# Patient Record
Sex: MALE | Race: WHITE | Employment: FULL TIME | URBAN - METROPOLITAN AREA
[De-identification: names, ages, dates, MRNs, and addresses within clinical notes are randomized per-mention and may not be internally consistent; named-entity substitution may affect disease eponyms.]

---

## 2019-08-16 ENCOUNTER — OFFICE VISIT (OUTPATIENT)
Dept: FAMILY MEDICINE CLINIC | Age: 29
End: 2019-08-16
Payer: COMMERCIAL

## 2019-08-16 VITALS
OXYGEN SATURATION: 99 % | WEIGHT: 209 LBS | SYSTOLIC BLOOD PRESSURE: 138 MMHG | BODY MASS INDEX: 25.99 KG/M2 | HEART RATE: 109 BPM | HEIGHT: 75 IN | DIASTOLIC BLOOD PRESSURE: 82 MMHG

## 2019-08-16 DIAGNOSIS — Z87.39 H/O JUVENILE RHEUMATOID ARTHRITIS: ICD-10-CM

## 2019-08-16 DIAGNOSIS — Z76.89 ENCOUNTER TO ESTABLISH CARE WITH NEW DOCTOR: Primary | ICD-10-CM

## 2019-08-16 LAB
BASOPHILS ABSOLUTE: 0 K/UL (ref 0–0.2)
BASOPHILS RELATIVE PERCENT: 1.1 %
C-REACTIVE PROTEIN: <0.3 MG/L (ref 0–5.1)
EOSINOPHILS ABSOLUTE: 0.1 K/UL (ref 0–0.6)
EOSINOPHILS RELATIVE PERCENT: 4.5 %
HCT VFR BLD CALC: 43.2 % (ref 40.5–52.5)
HEMOGLOBIN: 15.3 G/DL (ref 13.5–17.5)
LYMPHOCYTES ABSOLUTE: 1.2 K/UL (ref 1–5.1)
LYMPHOCYTES RELATIVE PERCENT: 37.9 %
MCH RBC QN AUTO: 31.2 PG (ref 26–34)
MCHC RBC AUTO-ENTMCNC: 35.4 G/DL (ref 31–36)
MCV RBC AUTO: 88.3 FL (ref 80–100)
MONOCYTES ABSOLUTE: 0.3 K/UL (ref 0–1.3)
MONOCYTES RELATIVE PERCENT: 8.2 %
NEUTROPHILS ABSOLUTE: 1.6 K/UL (ref 1.7–7.7)
NEUTROPHILS RELATIVE PERCENT: 48.3 %
PDW BLD-RTO: 12.5 % (ref 12.4–15.4)
PLATELET # BLD: 240 K/UL (ref 135–450)
PMV BLD AUTO: 8.2 FL (ref 5–10.5)
RBC # BLD: 4.89 M/UL (ref 4.2–5.9)
RHEUMATOID FACTOR: <10 IU/ML
SEDIMENTATION RATE, ERYTHROCYTE: 4 MM/HR (ref 0–15)
WBC # BLD: 3.3 K/UL (ref 4–11)

## 2019-08-16 PROCEDURE — 99204 OFFICE O/P NEW MOD 45 MIN: CPT | Performed by: FAMILY MEDICINE

## 2019-08-16 RX ORDER — PREDNISONE 1 MG/1
TABLET ORAL
Qty: 30 TABLET | Refills: 0 | Status: SHIPPED | OUTPATIENT
Start: 2019-08-16 | End: 2019-09-05

## 2019-08-16 SDOH — HEALTH STABILITY: MENTAL HEALTH: HOW MANY STANDARD DRINKS CONTAINING ALCOHOL DO YOU HAVE ON A TYPICAL DAY?: 1 OR 2

## 2019-08-16 SDOH — SOCIAL STABILITY: SOCIAL INSECURITY
WITHIN THE LAST YEAR, HAVE YOU BEEN KICKED, HIT, SLAPPED, OR OTHERWISE PHYSICALLY HURT BY YOUR PARTNER OR EX-PARTNER?: NO

## 2019-08-16 SDOH — HEALTH STABILITY: MENTAL HEALTH
STRESS IS WHEN SOMEONE FEELS TENSE, NERVOUS, ANXIOUS, OR CAN'T SLEEP AT NIGHT BECAUSE THEIR MIND IS TROUBLED. HOW STRESSED ARE YOU?: NOT AT ALL

## 2019-08-16 SDOH — ECONOMIC STABILITY: TRANSPORTATION INSECURITY
IN THE PAST 12 MONTHS, HAS THE LACK OF TRANSPORTATION KEPT YOU FROM MEDICAL APPOINTMENTS OR FROM GETTING MEDICATIONS?: NO

## 2019-08-16 SDOH — HEALTH STABILITY: MENTAL HEALTH: HOW OFTEN DO YOU HAVE A DRINK CONTAINING ALCOHOL?: 2-3 TIMES A WEEK

## 2019-08-16 SDOH — SOCIAL STABILITY: SOCIAL NETWORK: HOW OFTEN DO YOU ATTEND CHURCH OR RELIGIOUS SERVICES?: 1 TO 4 TIMES PER YEAR

## 2019-08-16 SDOH — ECONOMIC STABILITY: FOOD INSECURITY: WITHIN THE PAST 12 MONTHS, YOU WORRIED THAT YOUR FOOD WOULD RUN OUT BEFORE YOU GOT MONEY TO BUY MORE.: NEVER TRUE

## 2019-08-16 SDOH — HEALTH STABILITY: PHYSICAL HEALTH: ON AVERAGE, HOW MANY MINUTES DO YOU ENGAGE IN EXERCISE AT THIS LEVEL?: 0 MIN

## 2019-08-16 SDOH — SOCIAL STABILITY: SOCIAL NETWORK: HOW OFTEN DO YOU GET TOGETHER WITH FRIENDS OR RELATIVES?: MORE THAN THREE TIMES A WEEK

## 2019-08-16 SDOH — SOCIAL STABILITY: SOCIAL NETWORK
DO YOU BELONG TO ANY CLUBS OR ORGANIZATIONS SUCH AS CHURCH GROUPS UNIONS, FRATERNAL OR ATHLETIC GROUPS, OR SCHOOL GROUPS?: NO

## 2019-08-16 SDOH — SOCIAL STABILITY: SOCIAL NETWORK
IN A TYPICAL WEEK, HOW MANY TIMES DO YOU TALK ON THE PHONE WITH FAMILY, FRIENDS, OR NEIGHBORS?: MORE THAN THREE TIMES A WEEK

## 2019-08-16 SDOH — ECONOMIC STABILITY: INCOME INSECURITY: HOW HARD IS IT FOR YOU TO PAY FOR THE VERY BASICS LIKE FOOD, HOUSING, MEDICAL CARE, AND HEATING?: NOT HARD AT ALL

## 2019-08-16 SDOH — ECONOMIC STABILITY: TRANSPORTATION INSECURITY
IN THE PAST 12 MONTHS, HAS LACK OF TRANSPORTATION KEPT YOU FROM MEETINGS, WORK, OR FROM GETTING THINGS NEEDED FOR DAILY LIVING?: NO

## 2019-08-16 SDOH — SOCIAL STABILITY: SOCIAL NETWORK: HOW OFTEN DO YOU ATTENT MEETINGS OF THE CLUB OR ORGANIZATION YOU BELONG TO?: NEVER

## 2019-08-16 SDOH — ECONOMIC STABILITY: FOOD INSECURITY: WITHIN THE PAST 12 MONTHS, THE FOOD YOU BOUGHT JUST DIDN'T LAST AND YOU DIDN'T HAVE MONEY TO GET MORE.: NEVER TRUE

## 2019-08-16 SDOH — HEALTH STABILITY: PHYSICAL HEALTH: ON AVERAGE, HOW MANY DAYS PER WEEK DO YOU ENGAGE IN MODERATE TO STRENUOUS EXERCISE (LIKE A BRISK WALK)?: 0 DAYS

## 2019-08-16 SDOH — SOCIAL STABILITY: SOCIAL INSECURITY
WITHIN THE LAST YEAR, HAVE TO BEEN RAPED OR FORCED TO HAVE ANY KIND OF SEXUAL ACTIVITY BY YOUR PARTNER OR EX-PARTNER?: NO

## 2019-08-16 SDOH — SOCIAL STABILITY: SOCIAL INSECURITY: WITHIN THE LAST YEAR, HAVE YOU BEEN AFRAID OF YOUR PARTNER OR EX-PARTNER?: NO

## 2019-08-16 SDOH — SOCIAL STABILITY: SOCIAL INSECURITY: WITHIN THE LAST YEAR, HAVE YOU BEEN HUMILIATED OR EMOTIONALLY ABUSED IN OTHER WAYS BY YOUR PARTNER OR EX-PARTNER?: NO

## 2019-08-16 ASSESSMENT — ENCOUNTER SYMPTOMS
CONSTIPATION: 0
SHORTNESS OF BREATH: 0
BACK PAIN: 0
COUGH: 0
VOMITING: 0
CHEST TIGHTNESS: 0
TROUBLE SWALLOWING: 0
DIARRHEA: 0
PHOTOPHOBIA: 0
ABDOMINAL PAIN: 0
RHINORRHEA: 0
NAUSEA: 0
SORE THROAT: 0
BLOOD IN STOOL: 0

## 2019-08-16 ASSESSMENT — PATIENT HEALTH QUESTIONNAIRE - PHQ9
SUM OF ALL RESPONSES TO PHQ9 QUESTIONS 1 & 2: 0
SUM OF ALL RESPONSES TO PHQ QUESTIONS 1-9: 0
SUM OF ALL RESPONSES TO PHQ QUESTIONS 1-9: 0
2. FEELING DOWN, DEPRESSED OR HOPELESS: 0
1. LITTLE INTEREST OR PLEASURE IN DOING THINGS: 0

## 2019-08-16 NOTE — PROGRESS NOTES
warehouse / delivery    Generally active     Social History     Substance and Sexual Activity   Sexual Activity Not Currently          Patient's past medical history, surgical history, family history, medications,  andallergies  were all reviewed and updated as appropriate today. Review of Systems   Constitutional: Positive for fatigue. Negative for chills and fever. HENT: Negative for hearing loss, rhinorrhea, sore throat and trouble swallowing. Eyes: Negative for photophobia and visual disturbance. Respiratory: Negative for cough, chest tightness and shortness of breath. Cardiovascular: Negative for chest pain, palpitations and leg swelling. Gastrointestinal: Negative for abdominal pain, blood in stool, constipation, diarrhea, nausea and vomiting. Endocrine: Negative for polydipsia, polyphagia and polyuria. Genitourinary: Negative for difficulty urinating, dysuria and frequency. Musculoskeletal: Positive for arthralgias, joint swelling, myalgias, neck pain and neck stiffness. Negative for back pain and gait problem. Skin: Negative for rash and wound. Neurological: Negative for dizziness, syncope, weakness, light-headedness, numbness and headaches. Hematological: Negative for adenopathy. Does not bruise/bleed easily. Psychiatric/Behavioral: Negative for dysphoric mood and sleep disturbance. The patient is not nervous/anxious. Vitals:    08/16/19 0920   BP: 138/82   Pulse: 109   SpO2: 99%   Weight: 209 lb (94.8 kg)   Height: 6' 3\" (1.905 m)       Physical Exam   Constitutional: He is oriented to person, place, and time. He appears well-developed and well-nourished. No distress. HENT:   Head: Normocephalic and atraumatic. Mouth/Throat: Oropharynx is clear and moist. No oropharyngeal exudate. Eyes: Pupils are equal, round, and reactive to light. Conjunctivae and EOM are normal.   Neck: Normal range of motion. Neck supple. No thyromegaly present.    Cardiovascular: Normal

## 2019-08-17 LAB
ANTI-NUCLEAR ANTIBODY (ANA): NEGATIVE
CYCLIC CITRULLINATED PEPTIDE ANTIBODY IGG: <0.5 U/ML (ref 0–2.9)

## 2019-10-15 ENCOUNTER — OFFICE VISIT (OUTPATIENT)
Dept: RHEUMATOLOGY | Age: 29
End: 2019-10-15
Payer: COMMERCIAL

## 2019-10-15 VITALS
WEIGHT: 216 LBS | HEIGHT: 75 IN | SYSTOLIC BLOOD PRESSURE: 122 MMHG | DIASTOLIC BLOOD PRESSURE: 76 MMHG | BODY MASS INDEX: 26.86 KG/M2

## 2019-10-15 DIAGNOSIS — L40.50 PSORIATIC ARTHRITIS (HCC): ICD-10-CM

## 2019-10-15 DIAGNOSIS — Z79.899 HIGH RISK MEDICATION USE: ICD-10-CM

## 2019-10-15 DIAGNOSIS — L40.50 PSORIATIC ARTHRITIS (HCC): Primary | ICD-10-CM

## 2019-10-15 DIAGNOSIS — L40.9 PSORIASIS: ICD-10-CM

## 2019-10-15 LAB
A/G RATIO: 2.1 (ref 1.1–2.2)
ALBUMIN SERPL-MCNC: 5 G/DL (ref 3.4–5)
ALP BLD-CCNC: 56 U/L (ref 40–129)
ALT SERPL-CCNC: 19 U/L (ref 10–40)
ANION GAP SERPL CALCULATED.3IONS-SCNC: 14 MMOL/L (ref 3–16)
AST SERPL-CCNC: 22 U/L (ref 15–37)
BILIRUB SERPL-MCNC: 0.6 MG/DL (ref 0–1)
BUN BLDV-MCNC: 14 MG/DL (ref 7–20)
CALCIUM SERPL-MCNC: 10 MG/DL (ref 8.3–10.6)
CHLORIDE BLD-SCNC: 104 MMOL/L (ref 99–110)
CO2: 24 MMOL/L (ref 21–32)
CREAT SERPL-MCNC: 0.8 MG/DL (ref 0.9–1.3)
GFR AFRICAN AMERICAN: >60
GFR NON-AFRICAN AMERICAN: >60
GLOBULIN: 2.4 G/DL
GLUCOSE BLD-MCNC: 101 MG/DL (ref 70–99)
POTASSIUM SERPL-SCNC: 4 MMOL/L (ref 3.5–5.1)
SODIUM BLD-SCNC: 142 MMOL/L (ref 136–145)
TOTAL PROTEIN: 7.4 G/DL (ref 6.4–8.2)

## 2019-10-15 PROCEDURE — G8484 FLU IMMUNIZE NO ADMIN: HCPCS | Performed by: INTERNAL MEDICINE

## 2019-10-15 PROCEDURE — 99204 OFFICE O/P NEW MOD 45 MIN: CPT | Performed by: INTERNAL MEDICINE

## 2019-10-15 PROCEDURE — G8427 DOCREV CUR MEDS BY ELIG CLIN: HCPCS | Performed by: INTERNAL MEDICINE

## 2019-10-15 PROCEDURE — G8419 CALC BMI OUT NRM PARAM NOF/U: HCPCS | Performed by: INTERNAL MEDICINE

## 2019-10-15 RX ORDER — SULFASALAZINE 500 MG/1
500 TABLET ORAL 2 TIMES DAILY
Qty: 120 TABLET | Refills: 1 | Status: SHIPPED | OUTPATIENT
Start: 2019-10-15 | End: 2020-01-13

## 2019-10-15 RX ORDER — NAPROXEN 500 MG/1
500 TABLET ORAL 2 TIMES DAILY WITH MEALS
COMMUNITY
End: 2020-11-18

## 2019-10-15 RX ORDER — PREDNISONE 1 MG/1
5 TABLET ORAL DAILY
Qty: 90 TABLET | Refills: 0 | Status: SHIPPED | OUTPATIENT
Start: 2019-10-15 | End: 2020-01-13

## 2019-10-17 LAB — HLA B27: NEGATIVE

## 2020-01-13 RX ORDER — SULFASALAZINE 500 MG/1
TABLET ORAL
Qty: 120 TABLET | Refills: 0 | Status: SHIPPED | OUTPATIENT
Start: 2020-01-13 | End: 2020-11-18

## 2020-01-17 ENCOUNTER — OFFICE VISIT (OUTPATIENT)
Dept: RHEUMATOLOGY | Age: 30
End: 2020-01-17
Payer: COMMERCIAL

## 2020-01-17 VITALS
SYSTOLIC BLOOD PRESSURE: 134 MMHG | BODY MASS INDEX: 26.98 KG/M2 | DIASTOLIC BLOOD PRESSURE: 84 MMHG | WEIGHT: 217 LBS | HEIGHT: 75 IN

## 2020-01-17 LAB
ALT SERPL-CCNC: 19 U/L (ref 10–40)
AST SERPL-CCNC: 21 U/L (ref 15–37)
BASOPHILS ABSOLUTE: 0 K/UL (ref 0–0.2)
BASOPHILS RELATIVE PERCENT: 1.1 %
EOSINOPHILS ABSOLUTE: 0.1 K/UL (ref 0–0.6)
EOSINOPHILS RELATIVE PERCENT: 2.3 %
HCT VFR BLD CALC: 46.2 % (ref 40.5–52.5)
HEMOGLOBIN: 15.7 G/DL (ref 13.5–17.5)
LYMPHOCYTES ABSOLUTE: 1.5 K/UL (ref 1–5.1)
LYMPHOCYTES RELATIVE PERCENT: 37.4 %
MCH RBC QN AUTO: 30.9 PG (ref 26–34)
MCHC RBC AUTO-ENTMCNC: 34 G/DL (ref 31–36)
MCV RBC AUTO: 90.9 FL (ref 80–100)
MONOCYTES ABSOLUTE: 0.4 K/UL (ref 0–1.3)
MONOCYTES RELATIVE PERCENT: 10.7 %
NEUTROPHILS ABSOLUTE: 1.9 K/UL (ref 1.7–7.7)
NEUTROPHILS RELATIVE PERCENT: 48.5 %
PDW BLD-RTO: 12.9 % (ref 12.4–15.4)
PLATELET # BLD: 248 K/UL (ref 135–450)
PMV BLD AUTO: 8.4 FL (ref 5–10.5)
RBC # BLD: 5.08 M/UL (ref 4.2–5.9)
WBC # BLD: 4 K/UL (ref 4–11)

## 2020-01-17 PROCEDURE — G8484 FLU IMMUNIZE NO ADMIN: HCPCS | Performed by: INTERNAL MEDICINE

## 2020-01-17 PROCEDURE — 1036F TOBACCO NON-USER: CPT | Performed by: INTERNAL MEDICINE

## 2020-01-17 PROCEDURE — 99214 OFFICE O/P EST MOD 30 MIN: CPT | Performed by: INTERNAL MEDICINE

## 2020-01-17 PROCEDURE — G8427 DOCREV CUR MEDS BY ELIG CLIN: HCPCS | Performed by: INTERNAL MEDICINE

## 2020-01-17 PROCEDURE — G8419 CALC BMI OUT NRM PARAM NOF/U: HCPCS | Performed by: INTERNAL MEDICINE

## 2020-01-17 PROCEDURE — 36415 COLL VENOUS BLD VENIPUNCTURE: CPT | Performed by: INTERNAL MEDICINE

## 2020-01-17 RX ORDER — SULFASALAZINE 500 MG/1
TABLET ORAL
Qty: 360 TABLET | Refills: 0 | Status: SHIPPED | OUTPATIENT
Start: 2020-01-17 | End: 2020-12-07

## 2020-01-17 NOTE — PROGRESS NOTES
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 Current Outpatient Medications   Medication Sig Dispense Refill    sulfaSALAzine (AZULFIDINE) 500 MG tablet Take 2 tab po  tablet 0    sulfaSALAzine (AZULFIDINE) 500 MG tablet TAKE TWO TABLETS BY MOUTH TWICE A  tablet 0    naproxen (NAPROSYN) 500 MG tablet Take 500 mg by mouth 2 times daily (with meals)       No current facility-administered medications for this visit. No Known Allergies    PHYSICAL EXAM:    Vitals:    /84   Ht 6' 3\" (1.905 m)   Wt 217 lb (98.4 kg)   BMI 27.12 kg/m²   General appearance/ Psychiatric: well nourished, and well groomed, normal judgement, alert, appears stated age and cooperative. MKS: Normal musculoskeletal examination and upper, lower extremities and spine without any tender, swollen or inflamed joints including his finger joints and knees. No dactylitis or enthesitis. Full range of motion in cervical and lumbar paraspinal muscles. No focal tenderness. Skin: No skin lesions. No induration, rashes, erythema. No nail changes or digital ischemia.       DATA:   Lab Results   Component Value Date    WBC 3.3 (L) 08/16/2019    HGB 15.3 08/16/2019    HCT 43.2 08/16/2019    MCV 88.3 08/16/2019     08/16/2019         Chemistry        Component Value Date/Time     10/15/2019 0825    K 4.0 10/15/2019 0825     10/15/2019 0825    CO2 24 10/15/2019 0825    BUN 14 10/15/2019 0825    CREATININE 0.8 (L) 10/15/2019 0825        Component Value Date/Time    CALCIUM 10.0 10/15/2019 0825 ALKPHOS 56 10/15/2019 0825    AST 22 10/15/2019 0825    ALT 19 10/15/2019 0825    BILITOT 0.6 10/15/2019 0825          No results found for: [facility]  Lab Results   Component Value Date    SEDRATE 4 08/16/2019     Lab Results   Component Value Date    CRP <0.3 08/16/2019     Lab Results   Component Value Date    BYRON Negative 08/16/2019    SEDRATE 4 08/16/2019            A/P- See above.

## 2020-05-13 ENCOUNTER — VIRTUAL VISIT (OUTPATIENT)
Dept: RHEUMATOLOGY | Age: 30
End: 2020-05-13
Payer: COMMERCIAL

## 2020-05-13 PROCEDURE — 99214 OFFICE O/P EST MOD 30 MIN: CPT | Performed by: INTERNAL MEDICINE

## 2020-05-13 PROCEDURE — G8428 CUR MEDS NOT DOCUMENT: HCPCS | Performed by: INTERNAL MEDICINE

## 2020-05-13 RX ORDER — SULFASALAZINE 500 MG/1
TABLET ORAL
Qty: 120 TABLET | Refills: 3 | Status: SHIPPED | OUTPATIENT
Start: 2020-05-13 | End: 2020-11-11

## 2020-05-13 NOTE — PROGRESS NOTES
Vitals/Constitutional/EENT/Resp/CV/GI//MS/Neuro/Skin/Heme-Lymph-Imm. Pursuant to the emergency declaration under the [address] [place] [address], [address] [place] [address] and the [employer] Act, this Virtual Visit was conducted with patient's (and/or legal guardian's) consent, to reduce the patient's risk of exposure to COVID-19 and provide necessary medical care. The patient (and/or legal guardian) has also been advised to contact this office for worsening conditions or problems, and seek emergency medical treatment and/or call 911 if deemed necessary. Patient identification was verified at the start of the visit: {YES/  Total time spent on this encounter: not used    Services were provided through a video synchronous discussion virtually to substitute for in-person clinic visit. Patient and provider were located at their individual homes. --Iraj Lee MD on 5/13/2020 at 8:17 AM    An electronic signature was used to authenticate this note.

## 2020-05-27 DIAGNOSIS — Z79.899 HIGH RISK MEDICATION USE: ICD-10-CM

## 2020-05-27 LAB
A/G RATIO: 2.9 (ref 1.1–2.2)
ALBUMIN SERPL-MCNC: 5.3 G/DL (ref 3.4–5)
ALP BLD-CCNC: 53 U/L (ref 40–129)
ALT SERPL-CCNC: 18 U/L (ref 10–40)
ANION GAP SERPL CALCULATED.3IONS-SCNC: 12 MMOL/L (ref 3–16)
AST SERPL-CCNC: 25 U/L (ref 15–37)
BASOPHILS ABSOLUTE: 0 K/UL (ref 0–0.2)
BASOPHILS RELATIVE PERCENT: 0.9 %
BILIRUB SERPL-MCNC: 0.5 MG/DL (ref 0–1)
BUN BLDV-MCNC: 10 MG/DL (ref 7–20)
CALCIUM SERPL-MCNC: 9.7 MG/DL (ref 8.3–10.6)
CHLORIDE BLD-SCNC: 103 MMOL/L (ref 99–110)
CO2: 24 MMOL/L (ref 21–32)
CREAT SERPL-MCNC: 0.7 MG/DL (ref 0.9–1.3)
EOSINOPHILS ABSOLUTE: 0.2 K/UL (ref 0–0.6)
EOSINOPHILS RELATIVE PERCENT: 5.2 %
GFR AFRICAN AMERICAN: >60
GFR NON-AFRICAN AMERICAN: >60
GLOBULIN: 1.8 G/DL
GLUCOSE BLD-MCNC: 84 MG/DL (ref 70–99)
HCT VFR BLD CALC: 42.6 % (ref 40.5–52.5)
HEMOGLOBIN: 14.4 G/DL (ref 13.5–17.5)
LYMPHOCYTES ABSOLUTE: 1.4 K/UL (ref 1–5.1)
LYMPHOCYTES RELATIVE PERCENT: 39.5 %
MCH RBC QN AUTO: 30.8 PG (ref 26–34)
MCHC RBC AUTO-ENTMCNC: 33.7 G/DL (ref 31–36)
MCV RBC AUTO: 91.5 FL (ref 80–100)
MONOCYTES ABSOLUTE: 0.3 K/UL (ref 0–1.3)
MONOCYTES RELATIVE PERCENT: 7.8 %
NEUTROPHILS ABSOLUTE: 1.7 K/UL (ref 1.7–7.7)
NEUTROPHILS RELATIVE PERCENT: 46.6 %
PDW BLD-RTO: 12.2 % (ref 12.4–15.4)
PLATELET # BLD: 219 K/UL (ref 135–450)
PMV BLD AUTO: 8.2 FL (ref 5–10.5)
POTASSIUM SERPL-SCNC: 4.5 MMOL/L (ref 3.5–5.1)
RBC # BLD: 4.66 M/UL (ref 4.2–5.9)
SODIUM BLD-SCNC: 139 MMOL/L (ref 136–145)
TOTAL PROTEIN: 7.1 G/DL (ref 6.4–8.2)
WBC # BLD: 3.6 K/UL (ref 4–11)

## 2020-11-18 ENCOUNTER — VIRTUAL VISIT (OUTPATIENT)
Dept: RHEUMATOLOGY | Age: 30
End: 2020-11-18
Payer: COMMERCIAL

## 2020-11-18 PROCEDURE — G8427 DOCREV CUR MEDS BY ELIG CLIN: HCPCS | Performed by: INTERNAL MEDICINE

## 2020-11-18 PROCEDURE — 99214 OFFICE O/P EST MOD 30 MIN: CPT | Performed by: INTERNAL MEDICINE

## 2020-11-18 NOTE — PROGRESS NOTES
2020    TELEHEALTH EVALUATION -- Audio/Visual (During AGCMN-53 public health emergency)    HPI:    Morales Mcpherson (:  1990) has requested an audio/video evaluation for the following concern(s):  Follow-up for psoriasis and psoriatic arthritis. Colin More is doing very well in terms of psoriatic arthritis and psoriasis on sulfasalazine. Has no complaints or concerns. No musculoskeletal symptoms or skin psoriasis. Fortunately, sulfasalazine has helped wonderfully. Is tolerating medications well. Normal ADLs and recreational activities. Is due for labs. No rashes, GI side effects. Due for the labs. Rest of review of systems are negative. Review of Systems    Prior to Visit Medications    Medication Sig Taking? Authorizing Provider   sulfaSALAzine (AZULFIDINE) 500 MG tablet Take 2 tab po BID  Ladean Gitelman, MD       Social History     Tobacco Use    Smoking status: Never Smoker    Smokeless tobacco: Never Used   Substance Use Topics    Alcohol use: Yes     Alcohol/week: 10.0 standard drinks     Types: 10 Cans of beer per week     Frequency: 2-3 times a week     Drinks per session: 1 or 2     Binge frequency: Less than monthly     Comment: only drinks on some saturday and sundays    Drug use: Never          PHYSICAL EXAMINATION:  [ INSTRUCTIONS:  \"[x]\" Indicates a positive item  \"[]\" Indicates a negative item  -- DELETE ALL ITEMS NOT EXAMINED]  Vital Signs: (As obtained by patient/caregiver or practitioner observation)    Blood pressure-  Heart rate-    Respiratory rate-    Temperature-  Pulse oximetry-     Constitutional: [x] Appears well-developed and well-nourished [x] No apparent distress      [] Abnormal-   Mental status  [x] Alert and awake  [x] Oriented to person/place/time [x]Able to follow commands      Eyes:  EOM    []  Normal  [] Abnormal-  Sclera  []  Normal  [] Abnormal -         Discharge []  None visible  [] Abnormal -    HENT:   [x] Normocephalic, atraumatic.   [] Abnormal   [] Mouth/Throat: Mucous membranes are moist.     External Ears [] Normal  [] Abnormal-     Neck: [] No visualized mass     Pulmonary/Chest: [x] Respiratory effort normal.  [x] No visualized signs of difficulty breathing or respiratory distress        [] Abnormal-      Musculoskeletal:   [] Normal gait with no signs of ataxia         [] Normal range of motion of neck        [] Abnormal-does not have any abnormal musculoskeletal findings to show at the time of virtual visit. Neurological:        [] No Facial Asymmetry (Cranial nerve 7 motor function) (limited exam to video visit)          [] No gaze palsy        [] Abnormal-         Skin:        [x] No significant exanthematous lesions or discoloration noted on facial skin         [] Abnormal- No active Psoriatic rash           Psychiatric:       [] Normal Affect [] No Hallucinations        [] Abnormal-     Other pertinent observable physical exam findings-     ASSESSMENT/PLAN:  Freddy was seen today for follow-up and arthritis. Diagnoses and all orders for this visit:    Psoriasis    Psoriatic arthritis ([facility] [address]    High risk medication use      Psoriasis and psoriatic arthritis in remission on sulfasalazine 1 g twice daily. Lab work to be completed at his earliest convenience. He is aware that I will not be able to refill medication without blood work. Continue sulfasalazine 1 g twice daily. Follow-up in 4 months. No follow-ups on file. Jaime Bejarano is a 26 y.o. male being evaluated by a Virtual Visit (video visit) encounter to address concerns as mentioned above. A caregiver was present when appropriate. Due to this being a TeleHealth encounter (During [facility] public health emergency), evaluation of the following organ systems was limited: Vitals/Constitutional/EENT/Resp/CV/GI//MS/Neuro/Skin/Heme-Lymph-Imm.   Pursuant to the emergency declaration under the [facility] waiver authority and the Coronavirus Preparedness and Response Supplemental Appropriations Act, this Virtual Visit was conducted with patient's (and/or legal guardian's) consent, to reduce the patient's risk of exposure to COVID-19 and provide necessary medical care. The patient (and/or legal guardian) has also been advised to contact this office for worsening conditions or problems, and seek emergency medical treatment and/or call 911 if deemed necessary. Patient identification was verified at the start of the visit: {YES/  Total time spent on this encounter: not used    Services were provided through a video synchronous discussion virtually to substitute for in-person clinic visit. Patient and provider were located at their individual homes. --Cesar Barry MD on 11/18/2020 at 8:47 AM    An electronic signature was used to authenticate this note.

## 2020-12-03 ENCOUNTER — HOSPITAL ENCOUNTER (OUTPATIENT)
Age: 30
Discharge: HOME | End: 2020-12-03
Payer: COMMERCIAL

## 2020-12-03 LAB
ALT SERPL-CCNC: 29 U/L (ref 10–40)
AST SERPL-CCNC: 31 U/L (ref 15–37)
BASOPHILS ABSOLUTE: 0 K/UL (ref 0–0.2)
BASOPHILS RELATIVE PERCENT: 0.8 %
CREAT SERPL-MCNC: 0.8 MG/DL (ref 0.9–1.3)
EOSINOPHILS ABSOLUTE: 0.3 K/UL (ref 0–0.6)
EOSINOPHILS RELATIVE PERCENT: 6.4 %
GFR AFRICAN AMERICAN: >60
GFR NON-AFRICAN AMERICAN: >60
HCT VFR BLD CALC: 44.6 % (ref 40.5–52.5)
HEMOGLOBIN: 15.2 G/DL (ref 13.5–17.5)
LYMPHOCYTES ABSOLUTE: 1.7 K/UL (ref 1–5.1)
LYMPHOCYTES RELATIVE PERCENT: 38 %
MCH RBC QN AUTO: 30.7 PG (ref 26–34)
MCHC RBC AUTO-ENTMCNC: 34.1 G/DL (ref 31–36)
MCV RBC AUTO: 89.9 FL (ref 80–100)
MONOCYTES ABSOLUTE: 0.5 K/UL (ref 0–1.3)
MONOCYTES RELATIVE PERCENT: 11 %
NEUTROPHILS ABSOLUTE: 1.9 K/UL (ref 1.7–7.7)
NEUTROPHILS RELATIVE PERCENT: 43.8 %
PDW BLD-RTO: 12.2 % (ref 12.4–15.4)
PLATELET # BLD: 241 K/UL (ref 135–450)
PMV BLD AUTO: 8 FL (ref 5–10.5)
RBC # BLD: 4.96 M/UL (ref 4.2–5.9)
WBC # BLD: 4.3 K/UL (ref 4–11)

## 2020-12-03 PROCEDURE — 84450 TRANSFERASE (AST) (SGOT): CPT

## 2020-12-03 PROCEDURE — 85025 COMPLETE CBC W/AUTO DIFF WBC: CPT

## 2020-12-03 PROCEDURE — 82565 ASSAY OF CREATININE: CPT

## 2020-12-03 PROCEDURE — 36415 COLL VENOUS BLD VENIPUNCTURE: CPT

## 2020-12-03 PROCEDURE — 84460 ALANINE AMINO (ALT) (SGPT): CPT

## 2021-03-26 ENCOUNTER — VIRTUAL VISIT (OUTPATIENT)
Dept: RHEUMATOLOGY | Age: 31
End: 2021-03-26
Payer: COMMERCIAL

## 2021-03-26 ENCOUNTER — HOSPITAL ENCOUNTER (OUTPATIENT)
Age: 31
Discharge: HOME | End: 2021-03-26
Payer: COMMERCIAL

## 2021-03-26 DIAGNOSIS — Z79.899 HIGH RISK MEDICATION USE: ICD-10-CM

## 2021-03-26 DIAGNOSIS — L40.9 PSORIASIS: ICD-10-CM

## 2021-03-26 DIAGNOSIS — L40.50 PSORIATIC ARTHRITIS (HCC): Primary | ICD-10-CM

## 2021-03-26 PROCEDURE — 36415 COLL VENOUS BLD VENIPUNCTURE: CPT

## 2021-03-26 PROCEDURE — 85652 RBC SED RATE AUTOMATED: CPT

## 2021-03-26 PROCEDURE — 86140 C-REACTIVE PROTEIN: CPT

## 2021-03-26 PROCEDURE — G8427 DOCREV CUR MEDS BY ELIG CLIN: HCPCS | Performed by: INTERNAL MEDICINE

## 2021-03-26 PROCEDURE — 80053 COMPREHEN METABOLIC PANEL: CPT

## 2021-03-26 PROCEDURE — 85025 COMPLETE CBC W/AUTO DIFF WBC: CPT

## 2021-03-26 PROCEDURE — 99214 OFFICE O/P EST MOD 30 MIN: CPT | Performed by: INTERNAL MEDICINE

## 2021-03-26 RX ORDER — SULFASALAZINE 500 MG/1
TABLET ORAL
Qty: 360 TABLET | Refills: 0 | Status: SHIPPED | OUTPATIENT
Start: 2021-03-26

## 2021-03-26 NOTE — PROGRESS NOTES
Fawn Garcia MD  [facility]  [address], [place], [address]   [phone] (H) [phone](F)      Liliana Morgan, 1990     Background information-   Inflammatory arthritis diagnosed at the age of 6, took methotrexate, Enbrel and naproxen until age 23. Has been off of  therapy until 2019 when he presented with migratory inflammatory arthritis affecting his finger joints, knees and neck. HLA-B27 negative. Has limited psoriasis that developed after arthritis affecting front of his knees, elbows and knuckles. RF, CCP, BYRON, HLA B 27 negative. [id number] for psoriasis and psoriatic arthritis. Interval changes-doing well in terms of psoriatic arthritis on sulfasalazine. Did run out of prescription last week due to drug being in backorder. Psoriasis is very limited front of the knees and elbows, managed with as needed topical ointments. Worries about flare of sulfasalazine. Normal ADLs and recreational activities. Tolerating medications well. Rest of review of systems are negative. Objective-  Awake, alert, oriented x3. Well-nourished, well groomed, has good judgment. Musculoskeletal-does not have any swollen or inflamed joints to show in virtual visit. Skin-no active psoriasis at this time. Appears comfortable with normal breathing effort.     Data:  Lab Results   Component Value Date    WBC 4.1 03/26/2021    HGB 14.6 03/26/2021    HCT 41.0 03/26/2021    MCV 87.1 03/26/2021     03/26/2021     Lab Results   Component Value Date     03/26/2021    K 3.9 03/26/2021     03/26/2021    CO2 26 03/26/2021    BUN 10 03/26/2021    CREATININE 0.9 03/26/2021    GLUCOSE 80 03/26/2021    CALCIUM 10.2 03/26/2021    PROT 7.5 03/26/2021    LABALBU 5.0 03/26/2021    BILITOT 0.8 03/26/2021    ALKPHOS 46 03/26/2021    AST 21 03/26/2021    ALT 16 03/26/2021    LABGLOM >60 03/26/2021    GFRAA >60 03/26/2021    AGRATIO 2.0 03/26/2021    GLOB 2.5 03/26/2021       Lab Results   Component Value Date    SEDRATE 5 03/26/2021     Lab Results   Component Value Date    CRP <3.0 03/26/2021       Assessment and plan :    Freddy was seen today for follow-up. Diagnoses and all orders for this visit:    Psoriatic arthritis ([facility] [address]    Psoriasis    High risk medication use  -     Cancel: Comprehensive Metabolic Panel; Future  -     Cancel: CBC Auto Differential; Future  -     Cancel: C-Reactive Protein; Future  -     Cancel: Sedimentation Rate; Future    Other orders  -     sulfaSALAzine (AZULFIDINE) 500 MG tablet; Take  2 tab po BID        Psoriasis-limited, manageable with topical corticosteroid ointment. Psoriatic arthritis-asymptomatic-we will renew sulfasalazine, E scribed sulfasalazine to mail in pharmacy. He was advised to call us if he is not able to fill sulfasalazine, in that case will plan for low-dose methotrexate. Apparently due to backorder as he is not able to fill sulfasalazine in local retail pharmacies. Safety labs will be checked today to monitor medication toxicities and disease activity. He was advised to call me with any flares, follow-up in 3 months. No follow-ups on file. Total time 32 minutes that includes the following-    Preparing to see the patient such as reviewing patients records, pre-charting and preparing for the visit on the same. History, physical examination, explaining management plan, ordering labs, prescription, communicating findings to primary care provider and documenting clinical information in electronic medical records and/or other health records. Seun Oleary is a 26 y.o. male being evaluated by a Virtual Visit (video visit) encounter to address concerns as mentioned above. A caregiver was present when appropriate.  Due to this being a TeleHealth encounter (During STWWX-70 public health emergency), evaluation of the following organ systems was limited: Vitals/Constitutional/EENT/Resp/CV/GI//MS/Neuro/Skin/Heme-Lymph-Imm. Pursuant to the emergency declaration under the [address] [place] [address] and the [employer] Act, this Virtual Visit was conducted with patient's (and/or legal guardian's) consent, to reduce the patient's risk of exposure to COVID-19 and provide necessary medical care. The patient (and/or legal guardian) has also been advised to contact this office for worsening conditions or problems, and seek emergency medical treatment and/or call 911 if deemed necessary. Patient identification was verified at the start of the visit: {YES/      Services were provided through a video synchronous discussion virtually to substitute for in-person clinic visit. Patient and provider were located at their individual homes. --Yovana Raines MD on 3/26/2021 at 10:08 AM    An electronic signature was used to authenticate this note.

## 2021-03-27 LAB
A/G RATIO: 2 (ref 1.1–2.2)
ALBUMIN SERPL-MCNC: 5 G/DL (ref 3.4–5)
ALP BLD-CCNC: 46 U/L (ref 40–129)
ALT SERPL-CCNC: 16 U/L (ref 10–40)
ANION GAP SERPL CALCULATED.3IONS-SCNC: 11 MMOL/L (ref 3–16)
AST SERPL-CCNC: 21 U/L (ref 15–37)
BASOPHILS ABSOLUTE: 0 K/UL (ref 0–0.2)
BASOPHILS RELATIVE PERCENT: 1 %
BILIRUB SERPL-MCNC: 0.8 MG/DL (ref 0–1)
BUN BLDV-MCNC: 10 MG/DL (ref 7–20)
C-REACTIVE PROTEIN: <3 MG/L (ref 0–5.1)
CALCIUM SERPL-MCNC: 10.2 MG/DL (ref 8.3–10.6)
CHLORIDE BLD-SCNC: 104 MMOL/L (ref 99–110)
CO2: 26 MMOL/L (ref 21–32)
CREAT SERPL-MCNC: 0.9 MG/DL (ref 0.9–1.3)
EOSINOPHILS ABSOLUTE: 0.3 K/UL (ref 0–0.6)
EOSINOPHILS RELATIVE PERCENT: 6.1 %
GFR AFRICAN AMERICAN: >60
GFR NON-AFRICAN AMERICAN: >60
GLOBULIN: 2.5 G/DL
GLUCOSE BLD-MCNC: 80 MG/DL (ref 70–99)
HCT VFR BLD CALC: 41 % (ref 40.5–52.5)
HEMOGLOBIN: 14.6 G/DL (ref 13.5–17.5)
LYMPHOCYTES ABSOLUTE: 1.7 K/UL (ref 1–5.1)
LYMPHOCYTES RELATIVE PERCENT: 41.1 %
MCH RBC QN AUTO: 31.1 PG (ref 26–34)
MCHC RBC AUTO-ENTMCNC: 35.7 G/DL (ref 31–36)
MCV RBC AUTO: 87.1 FL (ref 80–100)
MONOCYTES ABSOLUTE: 0.4 K/UL (ref 0–1.3)
MONOCYTES RELATIVE PERCENT: 9.3 %
NEUTROPHILS ABSOLUTE: 1.8 K/UL (ref 1.7–7.7)
NEUTROPHILS RELATIVE PERCENT: 42.5 %
PDW BLD-RTO: 12.6 % (ref 12.4–15.4)
PLATELET # BLD: 232 K/UL (ref 135–450)
PMV BLD AUTO: 8.6 FL (ref 5–10.5)
POTASSIUM SERPL-SCNC: 3.9 MMOL/L (ref 3.5–5.1)
RBC # BLD: 4.7 M/UL (ref 4.2–5.9)
SEDIMENTATION RATE, ERYTHROCYTE: 5 MM/HR (ref 0–15)
SODIUM BLD-SCNC: 141 MMOL/L (ref 136–145)
TOTAL PROTEIN: 7.5 G/DL (ref 6.4–8.2)
WBC # BLD: 4.1 K/UL (ref 4–11)

## 2021-06-25 ENCOUNTER — OFFICE VISIT (OUTPATIENT)
Dept: RHEUMATOLOGY | Age: 31
End: 2021-06-25
Payer: COMMERCIAL

## 2021-06-25 VITALS
WEIGHT: 217 LBS | BODY MASS INDEX: 26.98 KG/M2 | HEIGHT: 75 IN | SYSTOLIC BLOOD PRESSURE: 118 MMHG | DIASTOLIC BLOOD PRESSURE: 84 MMHG

## 2021-06-25 DIAGNOSIS — L40.50 PSORIATIC ARTHRITIS (HCC): Primary | ICD-10-CM

## 2021-06-25 DIAGNOSIS — Z79.899 HIGH RISK MEDICATION USE: ICD-10-CM

## 2021-06-25 DIAGNOSIS — L40.9 PSORIASIS: ICD-10-CM

## 2021-06-25 LAB
ALT SERPL-CCNC: 23 U/L (ref 10–40)
AST SERPL-CCNC: 27 U/L (ref 15–37)
BASOPHILS ABSOLUTE: 0 K/UL (ref 0–0.2)
BASOPHILS RELATIVE PERCENT: 0.7 %
CREAT SERPL-MCNC: 0.9 MG/DL (ref 0.9–1.3)
EOSINOPHILS ABSOLUTE: 0.1 K/UL (ref 0–0.6)
EOSINOPHILS RELATIVE PERCENT: 4.6 %
GFR AFRICAN AMERICAN: >60
GFR NON-AFRICAN AMERICAN: >60
HCT VFR BLD CALC: 43.5 % (ref 40.5–52.5)
HEMOGLOBIN: 15.1 G/DL (ref 13.5–17.5)
LYMPHOCYTES ABSOLUTE: 1.2 K/UL (ref 1–5.1)
LYMPHOCYTES RELATIVE PERCENT: 38.9 %
MCH RBC QN AUTO: 31.4 PG (ref 26–34)
MCHC RBC AUTO-ENTMCNC: 34.7 G/DL (ref 31–36)
MCV RBC AUTO: 90.3 FL (ref 80–100)
MONOCYTES ABSOLUTE: 0.3 K/UL (ref 0–1.3)
MONOCYTES RELATIVE PERCENT: 8.9 %
NEUTROPHILS ABSOLUTE: 1.5 K/UL (ref 1.7–7.7)
NEUTROPHILS RELATIVE PERCENT: 46.9 %
PDW BLD-RTO: 12.7 % (ref 12.4–15.4)
PLATELET # BLD: 205 K/UL (ref 135–450)
PMV BLD AUTO: 8.2 FL (ref 5–10.5)
RBC # BLD: 4.81 M/UL (ref 4.2–5.9)
WBC # BLD: 3.1 K/UL (ref 4–11)

## 2021-06-25 PROCEDURE — 36415 COLL VENOUS BLD VENIPUNCTURE: CPT | Performed by: INTERNAL MEDICINE

## 2021-06-25 PROCEDURE — 99214 OFFICE O/P EST MOD 30 MIN: CPT | Performed by: INTERNAL MEDICINE

## 2021-06-25 RX ORDER — SULFASALAZINE 500 MG/1
1000 TABLET ORAL 2 TIMES DAILY
Qty: 360 TABLET | Refills: 0 | Status: SHIPPED | OUTPATIENT
Start: 2021-06-25 | End: 2021-09-23

## 2021-06-25 NOTE — PROGRESS NOTES
medical records. #######################################################################    Subjective-he is asymptomatic in terms of psoriasis and psoriatic arthritis. Denies any complaints or concerns. Normal ADLs and recreational activities. Tolerating medications well. No dactylitis or enthesitis. All other review of systems are negative. Past Medical History:   Diagnosis Date    Anxiety     JRA (juvenile rheumatoid arthritis) ([facility] [address]      History reviewed. No pertinent surgical history. FH- 2 P uncles- Psoriasis. Current Outpatient Medications   Medication Sig Dispense Refill    sulfaSALAzine (AZULFIDINE) 500 MG tablet Take 2 tablets by mouth 2 times daily 360 tablet 0    sulfaSALAzine (AZULFIDINE) 500 MG tablet Take  2 tab po  tablet 0     No current facility-administered medications for this visit. No Known Allergies    PHYSICAL EXAM:    Vitals:    /84   Ht 6' 3\" (1.905 m)   Wt 217 lb (98.4 kg)   BMI 27.12 kg/m²   General appearance/ Psychiatric: well nourished, and well groomed, normal judgement, alert, appears stated age and cooperative. MKS: Normal musculoskeletal examination and upper, lower extremities and spine. Full range of motion all peripheral joints. No dactylitis or enthesitis. Skin: No skin lesions. No induration, rashes, erythema. No nail changes or digital ischemia.       DATA:   Lab Results   Component Value Date    WBC 4.1 03/26/2021    HGB 14.6 03/26/2021    HCT 41.0 03/26/2021    MCV 87.1 03/26/2021     03/26/2021         Chemistry        Component Value Date/Time     03/26/2021 1550    K 3.9 03/26/2021 1550     03/26/2021 1550    CO2 26 03/26/2021 1550    BUN 10 03/26/2021 1550    CREATININE 0.9 03/26/2021 1550        Component Value Date/Time    CALCIUM 10.2 03/26/2021 1550    ALKPHOS 46 03/26/2021 1550    AST 21 03/26/2021 1550    ALT 16 03/26/2021 1550    BILITOT 0.8 03/26/2021 1550          No results found for: [facility]  Lab Results   Component Value Date    SEDRATE 5 03/26/2021     Lab Results   Component Value Date    CRP <3.0 03/26/2021     Lab Results   Component Value Date    BYRON Negative 08/16/2019    SEDRATE 5 03/26/2021            A/P- See above.

## 2021-09-24 ENCOUNTER — OFFICE VISIT (OUTPATIENT)
Dept: RHEUMATOLOGY | Age: 31
End: 2021-09-24
Payer: COMMERCIAL

## 2021-09-24 VITALS
WEIGHT: 217 LBS | BODY MASS INDEX: 26.98 KG/M2 | DIASTOLIC BLOOD PRESSURE: 76 MMHG | SYSTOLIC BLOOD PRESSURE: 120 MMHG | HEIGHT: 75 IN

## 2021-09-24 DIAGNOSIS — Z79.899 HIGH RISK MEDICATION USE: ICD-10-CM

## 2021-09-24 DIAGNOSIS — L40.9 PSORIASIS: ICD-10-CM

## 2021-09-24 DIAGNOSIS — L40.50 PSORIATIC ARTHRITIS (HCC): Primary | ICD-10-CM

## 2021-09-24 LAB
ALT SERPL-CCNC: 19 U/L (ref 10–40)
AST SERPL-CCNC: 22 U/L (ref 15–37)
BASOPHILS ABSOLUTE: 0 K/UL (ref 0–0.2)
BASOPHILS RELATIVE PERCENT: 1.2 %
CREAT SERPL-MCNC: 0.9 MG/DL (ref 0.9–1.3)
EOSINOPHILS ABSOLUTE: 0.2 K/UL (ref 0–0.6)
EOSINOPHILS RELATIVE PERCENT: 5.5 %
GFR AFRICAN AMERICAN: >60
GFR NON-AFRICAN AMERICAN: >60
HCT VFR BLD CALC: 42.5 % (ref 40.5–52.5)
HEMOGLOBIN: 14.4 G/DL (ref 13.5–17.5)
LYMPHOCYTES ABSOLUTE: 0.9 K/UL (ref 1–5.1)
LYMPHOCYTES RELATIVE PERCENT: 28.7 %
MCH RBC QN AUTO: 30.9 PG (ref 26–34)
MCHC RBC AUTO-ENTMCNC: 33.9 G/DL (ref 31–36)
MCV RBC AUTO: 91.2 FL (ref 80–100)
MONOCYTES ABSOLUTE: 0.4 K/UL (ref 0–1.3)
MONOCYTES RELATIVE PERCENT: 14.2 %
NEUTROPHILS ABSOLUTE: 1.5 K/UL (ref 1.7–7.7)
NEUTROPHILS RELATIVE PERCENT: 50.4 %
PDW BLD-RTO: 12.8 % (ref 12.4–15.4)
PLATELET # BLD: 202 K/UL (ref 135–450)
PMV BLD AUTO: 8.2 FL (ref 5–10.5)
RBC # BLD: 4.66 M/UL (ref 4.2–5.9)
WBC # BLD: 3 K/UL (ref 4–11)

## 2021-09-24 PROCEDURE — 99214 OFFICE O/P EST MOD 30 MIN: CPT | Performed by: INTERNAL MEDICINE

## 2021-09-24 PROCEDURE — 36415 COLL VENOUS BLD VENIPUNCTURE: CPT | Performed by: INTERNAL MEDICINE

## 2021-09-24 RX ORDER — SULFASALAZINE 500 MG/1
TABLET ORAL
Qty: 360 TABLET | Refills: 0 | Status: SHIPPED | OUTPATIENT
Start: 2021-09-24

## 2021-09-24 NOTE — PROGRESS NOTES
[address], [place]                                                           [address] [facility], [facility] [address]                                                             [phone] (z) [phone] (T)      Dear Dr. Jaiden Yo MD:  Please find Rheumatology assessment. Thank you for giving me the opportunity to be involved in [address] [place] [address] care and I look forward following Freddy along with you. If you have any questions or concerns please feel free to reach me. Note is transcribed using voice recognition software. Inadvertent computerized transcription errors may be present. Patient identification: Kenneth Hanks,: 1990,30 y.o. Sex: male     A/P  Freddy was seen today for follow-up. Diagnoses and all orders for this visit:    [facility]    Psoriasis    High risk medication use  -     AST(SGOT) & ALT(SGPT)  -     CBC Auto Differential  -     Creatinine, Serum    Other orders  -     sulfaSALAzine (AZULFIDINE) 500 MG tablet; Take  2 tab po BID      Inflammatory arthritis diagnosed at the age of 6, took methotrexate, Enbrel and naproxen until age 23. A/P Today's visit-  Psoriasis and psoriatic arthritis-both conditions are in remission. Pleased with sulfasalazine, is taking 1 g twice daily. Normal ADLs and recreational activities. RF, CCP, BYRON negative. Plan-  Check safety labs as above. Prescriptions refilled. Call us with any questions or symptoms. Follow-up in 4 months. Patient indicates understanding and agrees with the management plan. I reviewed patient's history, referral documents and electronic medical records.     #######################################################################    Subjective-he is doing very well, denies any pain, stiffness or swelling in the joints. No active psoriasis. Normal ADLs and recreational activities. Tolerating medications well. Overall feels normal.     All other review of systems are negative. Past Medical History:   Diagnosis Date    Anxiety     JRA (juvenile rheumatoid arthritis) ([place] [address]      History reviewed. No pertinent surgical history. FH- 2 P uncles- Psoriasis. Current Outpatient Medications   Medication Sig Dispense Refill    sulfaSALAzine (AZULFIDINE) 500 MG tablet Take  2 tab po  tablet 0    sulfaSALAzine (AZULFIDINE) 500 MG tablet Take  2 tab po  tablet 0    sulfaSALAzine (AZULFIDINE) 500 MG tablet Take 2 tablets by mouth 2 times daily 360 tablet 0     No current facility-administered medications for this visit. No Known Allergies    PHYSICAL EXAM:    Vitals:    /76   Ht 6' 3\" (1.905 m)   Wt 217 lb (98.4 kg)   BMI 27.12 kg/m²   General appearance/ Psychiatric: well nourished, and well groomed, normal judgement, alert, appears stated age and cooperative. MKS: No appreciable tender swollen or inflamed joints in upper or lower extremities, full range of motion all peripheral joints. No dactylitis or enthesitis. No subjective musculoskeletal discomfort. Skin: No active psoriasis. No induration, rashes, erythema. No nail changes or digital ischemia.       DATA:   Lab Results   Component Value Date    WBC 3.1 (L) 06/25/2021    HGB 15.1 06/25/2021    HCT 43.5 06/25/2021    MCV 90.3 06/25/2021     06/25/2021         Chemistry        Component Value Date/Time     03/26/2021 1550    K 3.9 03/26/2021 1550     03/26/2021 1550    CO2 26 03/26/2021 1550    BUN 10 03/26/2021 1550    CREATININE 0.9 06/25/2021 0840        Component Value Date/Time    CALCIUM 10.2 03/26/2021 1550    ALKPHOS 46 03/26/2021 1550    AST 27 06/25/2021 0840    ALT 23 06/25/2021 0840    BILITOT 0.8 03/26/2021 1550          No results found for: [facility]  Lab Results Component Value Date    SEDRATE 5 03/26/2021     Lab Results   Component Value Date    CRP <3.0 03/26/2021     Lab Results   Component Value Date    BYRON Negative 08/16/2019    SEDRATE 5 03/26/2021            A/P- See above.

## 2021-12-17 ENCOUNTER — OFFICE VISIT (OUTPATIENT)
Dept: RHEUMATOLOGY | Age: 31
End: 2021-12-17
Payer: COMMERCIAL

## 2021-12-17 VITALS
BODY MASS INDEX: 26.98 KG/M2 | HEIGHT: 75 IN | SYSTOLIC BLOOD PRESSURE: 110 MMHG | WEIGHT: 217 LBS | DIASTOLIC BLOOD PRESSURE: 76 MMHG

## 2021-12-17 DIAGNOSIS — L40.9 PSORIASIS: ICD-10-CM

## 2021-12-17 DIAGNOSIS — L40.50 PSORIATIC ARTHRITIS (HCC): Primary | ICD-10-CM

## 2021-12-17 DIAGNOSIS — Z79.899 HIGH RISK MEDICATION USE: ICD-10-CM

## 2021-12-17 LAB
ALT SERPL-CCNC: 26 U/L (ref 10–40)
AST SERPL-CCNC: 28 U/L (ref 15–37)
BASOPHILS ABSOLUTE: 0 K/UL (ref 0–0.2)
BASOPHILS RELATIVE PERCENT: 0.7 %
C-REACTIVE PROTEIN: <3 MG/L (ref 0–5.1)
CREAT SERPL-MCNC: 0.9 MG/DL (ref 0.9–1.3)
EOSINOPHILS ABSOLUTE: 0.1 K/UL (ref 0–0.6)
EOSINOPHILS RELATIVE PERCENT: 4.1 %
GFR AFRICAN AMERICAN: >60
GFR NON-AFRICAN AMERICAN: >60
HCT VFR BLD CALC: 44.7 % (ref 40.5–52.5)
HEMOGLOBIN: 14.8 G/DL (ref 13.5–17.5)
LYMPHOCYTES ABSOLUTE: 1.1 K/UL (ref 1–5.1)
LYMPHOCYTES RELATIVE PERCENT: 36.5 %
MCH RBC QN AUTO: 30.2 PG (ref 26–34)
MCHC RBC AUTO-ENTMCNC: 33.1 G/DL (ref 31–36)
MCV RBC AUTO: 91.4 FL (ref 80–100)
MONOCYTES ABSOLUTE: 0.3 K/UL (ref 0–1.3)
MONOCYTES RELATIVE PERCENT: 8.9 %
NEUTROPHILS ABSOLUTE: 1.4 K/UL (ref 1.7–7.7)
NEUTROPHILS RELATIVE PERCENT: 49.8 %
PDW BLD-RTO: 12.4 % (ref 12.4–15.4)
PLATELET # BLD: 211 K/UL (ref 135–450)
PMV BLD AUTO: 8.1 FL (ref 5–10.5)
RBC # BLD: 4.89 M/UL (ref 4.2–5.9)
SEDIMENTATION RATE, ERYTHROCYTE: 0 MM/HR (ref 0–15)
WBC # BLD: 2.9 K/UL (ref 4–11)

## 2021-12-17 PROCEDURE — 36415 COLL VENOUS BLD VENIPUNCTURE: CPT | Performed by: INTERNAL MEDICINE

## 2021-12-17 PROCEDURE — 99214 OFFICE O/P EST MOD 30 MIN: CPT | Performed by: INTERNAL MEDICINE

## 2021-12-17 RX ORDER — SULFASALAZINE 500 MG/1
TABLET ORAL
Qty: 360 TABLET | Refills: 0 | Status: SHIPPED | OUTPATIENT
Start: 2021-12-17

## 2021-12-17 NOTE — PROGRESS NOTES
[address], [place]                                                           [address] [place], [place] [address]nuvia Borrego [address], [address] [place], [place] [address] [phone] (I) [phone] (F)      Dear Dr. Cortez primary care provider on file.:  Please find Rheumatology assessment. Thank you for giving me the opportunity to be involved in [address] [place] [address] care and I look forward following Freddy along with you. If you have any questions or concerns please feel free to reach me. Note is transcribed using voice recognition software. Inadvertent computerized transcription errors may be present. Patient identification: Kody Mccann Latonya,: 1990,31 y.o. Sex: male     A/P  Freddy was seen today for follow-up. Diagnoses and all orders for this visit:    Psoriatic arthritis ([facility] [address]    Psoriasis    High risk medication use  -     AST(SGOT) & ALT(SGPT)  -     CBC Auto Differential  -     C-Reactive Protein  -     Sedimentation Rate  -     Creatinine, Serum    Other orders  -     sulfaSALAzine (AZULFIDINE) 500 MG tablet; Take  2 tab po BID      Inflammatory arthritis diagnosed at the age of 6, took methotrexate, Enbrel and naproxen until age 23. A/P Today's visit-  Psoriasis and psoriatic arthritis-in clinical remission. Pleased with sulfasalazine 1 g twice daily. Safety labs will be checked today, prescription renewed. Normal ADLs and recreational activities. RF, CCP, BYRON negative. Plan-  As above. follow-up in 4 months. Patient indicates understanding and agrees with the management plan. I reviewed patient's history, referral documents and electronic medical records.     #######################################################################    Irestiven Joaovahedanilo is doing very well, remains asymptomatic. Normal ADLs and recreational activities. No complaints or concerns. On sulfasalazine 1 g twice daily for psoriasis and psoriatic arthritis. All other review of systems are negative. Past Medical History:   Diagnosis Date    Anxiety     JRA (juvenile rheumatoid arthritis) ([facility] [address]      History reviewed. No pertinent surgical history. FH- 2 P uncles- Psoriasis. Current Outpatient Medications   Medication Sig Dispense Refill    sulfaSALAzine (AZULFIDINE) 500 MG tablet Take  2 tab po  tablet 0    sulfaSALAzine (AZULFIDINE) 500 MG tablet Take  2 tab po  tablet 0    sulfaSALAzine (AZULFIDINE) 500 MG tablet Take  2 tab po  tablet 0    sulfaSALAzine (AZULFIDINE) 500 MG tablet Take 2 tablets by mouth 2 times daily 360 tablet 0     No current facility-administered medications for this visit. No Known Allergies    PHYSICAL EXAM:    Vitals:    /76   Ht 6' 3\" (1.905 m)   Wt 217 lb (98.4 kg)   BMI 27.12 kg/m²   General appearance/ Psychiatric: well nourished, and well groomed, normal judgement, alert, appears stated age and cooperative. MKS: Normal musculoskeletal examination upper, lower extremities spine, F ROM. No dactylitis or enthesitis. Skin: No active psoriasis. No induration, rashes, erythema. No nail changes or digital ischemia.       DATA:   Lab Results   Component Value Date    WBC 2.9 (L) 12/17/2021    HGB 14.8 12/17/2021    HCT 44.7 12/17/2021    MCV 91.4 12/17/2021     12/17/2021         Chemistry        Component Value Date/Time     03/26/2021 1550    K 3.9 03/26/2021 1550     03/26/2021 1550    CO2 26 03/26/2021 1550    BUN 10 03/26/2021 1550    CREATININE 0.9 12/17/2021 0931        Component Value Date/Time    CALCIUM 10.2 03/26/2021 1550    ALKPHOS 46 03/26/2021 1550    AST 28 12/17/2021 0931    ALT 26 12/17/2021 0931    BILITOT 0.8 03/26/2021 1550          No results found for: [facility]  Lab Results Component Value Date    SEDRATE 0 12/17/2021     Lab Results   Component Value Date    CRP <3.0 12/17/2021     Lab Results   Component Value Date    BYRON Negative 08/16/2019    SEDRATE 0 12/17/2021            A/P- See above.

## 2022-03-18 ENCOUNTER — OFFICE VISIT (OUTPATIENT)
Dept: RHEUMATOLOGY | Age: 32
End: 2022-03-18
Payer: COMMERCIAL

## 2022-03-18 VITALS
WEIGHT: 217 LBS | DIASTOLIC BLOOD PRESSURE: 84 MMHG | BODY MASS INDEX: 26.98 KG/M2 | SYSTOLIC BLOOD PRESSURE: 120 MMHG | HEIGHT: 75 IN

## 2022-03-18 DIAGNOSIS — Z79.899 HIGH RISK MEDICATION USE: ICD-10-CM

## 2022-03-18 DIAGNOSIS — L40.50 PSORIATIC ARTHRITIS (HCC): Primary | ICD-10-CM

## 2022-03-18 DIAGNOSIS — L40.9 PSORIASIS: ICD-10-CM

## 2022-03-18 LAB
A/G RATIO: 2.5 (ref 1.1–2.2)
ALBUMIN SERPL-MCNC: 5.2 G/DL (ref 3.4–5)
ALP BLD-CCNC: 67 U/L (ref 40–129)
ALT SERPL-CCNC: 19 U/L (ref 10–40)
ANION GAP SERPL CALCULATED.3IONS-SCNC: 16 MMOL/L (ref 3–16)
AST SERPL-CCNC: 24 U/L (ref 15–37)
BASOPHILS ABSOLUTE: 0 K/UL (ref 0–0.2)
BASOPHILS RELATIVE PERCENT: 1.1 %
BILIRUB SERPL-MCNC: 0.4 MG/DL (ref 0–1)
BUN BLDV-MCNC: 13 MG/DL (ref 7–20)
CALCIUM SERPL-MCNC: 10 MG/DL (ref 8.3–10.6)
CHLORIDE BLD-SCNC: 102 MMOL/L (ref 99–110)
CHOLESTEROL, TOTAL: 230 MG/DL (ref 0–199)
CO2: 21 MMOL/L (ref 21–32)
CREAT SERPL-MCNC: 0.8 MG/DL (ref 0.9–1.3)
EOSINOPHILS ABSOLUTE: 0.1 K/UL (ref 0–0.6)
EOSINOPHILS RELATIVE PERCENT: 4.6 %
GFR AFRICAN AMERICAN: >60
GFR NON-AFRICAN AMERICAN: >60
GLUCOSE BLD-MCNC: 85 MG/DL (ref 70–99)
HCT VFR BLD CALC: 44.2 % (ref 40.5–52.5)
HDLC SERPL-MCNC: 45 MG/DL (ref 40–60)
HEMOGLOBIN: 15.2 G/DL (ref 13.5–17.5)
LDL CHOLESTEROL CALCULATED: 173 MG/DL
LYMPHOCYTES ABSOLUTE: 1.3 K/UL (ref 1–5.1)
LYMPHOCYTES RELATIVE PERCENT: 39.6 %
MCH RBC QN AUTO: 31.1 PG (ref 26–34)
MCHC RBC AUTO-ENTMCNC: 34.4 G/DL (ref 31–36)
MCV RBC AUTO: 90.4 FL (ref 80–100)
MONOCYTES ABSOLUTE: 0.4 K/UL (ref 0–1.3)
MONOCYTES RELATIVE PERCENT: 11.7 %
NEUTROPHILS ABSOLUTE: 1.4 K/UL (ref 1.7–7.7)
NEUTROPHILS RELATIVE PERCENT: 43 %
PDW BLD-RTO: 12.7 % (ref 12.4–15.4)
PLATELET # BLD: 248 K/UL (ref 135–450)
PMV BLD AUTO: 7.9 FL (ref 5–10.5)
POTASSIUM SERPL-SCNC: 4.8 MMOL/L (ref 3.5–5.1)
RBC # BLD: 4.89 M/UL (ref 4.2–5.9)
SODIUM BLD-SCNC: 139 MMOL/L (ref 136–145)
TOTAL PROTEIN: 7.3 G/DL (ref 6.4–8.2)
TRIGL SERPL-MCNC: 62 MG/DL (ref 0–150)
VLDLC SERPL CALC-MCNC: 12 MG/DL
WBC # BLD: 3.2 K/UL (ref 4–11)

## 2022-03-18 PROCEDURE — 99214 OFFICE O/P EST MOD 30 MIN: CPT | Performed by: INTERNAL MEDICINE

## 2022-03-18 RX ORDER — SULFASALAZINE 500 MG/1
TABLET ORAL
Qty: 360 TABLET | Refills: 1 | Status: SHIPPED | OUTPATIENT
Start: 2022-03-18

## 2022-03-18 NOTE — PROGRESS NOTES
[address], [place]                                                           [address], [address]                                                             [phone] (P) [phone] (F)         Note is transcribed using voice recognition software. Inadvertent computerized transcription errors may be present. Patient identification: Aries Hanks,: 1990,31 y.o. Sex: male     A/P  Freddy was seen today for follow-up. Diagnoses and all orders for this visit:    Psoriatic arthritis ([facility] [address]    Psoriasis    High risk medication use  -     Comprehensive Metabolic Panel; Future  -     CBC with Auto Differential; Future  -     Lipid Panel; Future    Other orders  -     sulfaSALAzine (AZULFIDINE) 500 MG tablet; Take  2 tab po BID      Inflammatory arthritis diagnosed at the age of 6, took methotrexate, Enbrel and naproxen until age 23. A/P Today's visit-  Psoriasis and psoriatic arthritis-asymptomatic, placed on sulfasalazine 1 g twice daily. Safety labs will be checked today. Prescription renewed. All other questions were answered. Normal ADLs and recreational activities. RF, CCP, BYRON negative. Plan-  As above. follow-up in 4 months. Patient indicates understanding and agrees with the management plan. I reviewed patient's history, referral documents and electronic medical records. #######################################################################    Subjective-  He is doing very well. Denies any pain, swelling or stiffness in the joints. Tells me that minor occasional pain in his knees, no swelling, does not need any medications. No skin lesions. Pleased on current therapy. All other review of systems are negative.     Past Medical History: Diagnosis Date    Anxiety     JRA (juvenile rheumatoid arthritis) ([facility] [address]      History reviewed. No pertinent surgical history. FH- 2 P uncles- Psoriasis. Current Outpatient Medications   Medication Sig Dispense Refill    sulfaSALAzine (AZULFIDINE) 500 MG tablet Take  2 tab po  tablet 1    sulfaSALAzine (AZULFIDINE) 500 MG tablet Take  2 tab po  tablet 0    sulfaSALAzine (AZULFIDINE) 500 MG tablet Take  2 tab po  tablet 0    sulfaSALAzine (AZULFIDINE) 500 MG tablet Take  2 tab po  tablet 0    sulfaSALAzine (AZULFIDINE) 500 MG tablet Take 2 tablets by mouth 2 times daily 360 tablet 0     No current facility-administered medications for this visit. No Known Allergies    PHYSICAL EXAM:    Vitals:    /84   Ht 6' 3\" (1.905 m)   Wt 217 lb (98.4 kg)   BMI 27.12 kg/m²   General appearance/ Psychiatric: well nourished, and well groomed, normal judgement, alert, appears stated age and cooperative. MKS: Normal musculoskeletal findings in upper, lower extremities and spine. No dactylitis or enthesitis. Skin: No active psoriasis. No induration, rashes, erythema. No nail changes or digital ischemia.       DATA:   Lab Results   Component Value Date    WBC 2.9 (L) 12/17/2021    HGB 14.8 12/17/2021    HCT 44.7 12/17/2021    MCV 91.4 12/17/2021     12/17/2021         Chemistry        Component Value Date/Time     03/26/2021 1550    K 3.9 03/26/2021 1550     03/26/2021 1550    CO2 26 03/26/2021 1550    BUN 10 03/26/2021 1550    CREATININE 0.9 12/17/2021 0931        Component Value Date/Time    CALCIUM 10.2 03/26/2021 1550    ALKPHOS 46 03/26/2021 1550    AST 28 12/17/2021 0931    ALT 26 12/17/2021 0931    BILITOT 0.8 03/26/2021 1550          No results found for: [facility]  Lab Results   Component Value Date    SEDRATE 0 12/17/2021     Lab Results   Component Value Date    CRP <3.0 12/17/2021     Lab Results   Component Value Date    BYRON Negative 08/16/2019 SEDRATE 0 12/17/2021            A/P- See above.

## 2022-07-22 ENCOUNTER — OFFICE VISIT (OUTPATIENT)
Dept: RHEUMATOLOGY | Age: 32
End: 2022-07-22
Payer: COMMERCIAL

## 2022-07-22 VITALS
DIASTOLIC BLOOD PRESSURE: 76 MMHG | BODY MASS INDEX: 26.98 KG/M2 | WEIGHT: 217 LBS | SYSTOLIC BLOOD PRESSURE: 120 MMHG | HEIGHT: 75 IN

## 2022-07-22 DIAGNOSIS — Z79.899 HIGH RISK MEDICATION USE: ICD-10-CM

## 2022-07-22 DIAGNOSIS — L40.50 PSORIATIC ARTHRITIS (HCC): Primary | ICD-10-CM

## 2022-07-22 DIAGNOSIS — L40.9 PSORIASIS: ICD-10-CM

## 2022-07-22 LAB
ALT SERPL-CCNC: 25 U/L (ref 10–40)
AST SERPL-CCNC: 32 U/L (ref 15–37)
BASOPHILS ABSOLUTE: 0 K/UL (ref 0–0.2)
BASOPHILS RELATIVE PERCENT: 1.1 %
C-REACTIVE PROTEIN: <3 MG/L (ref 0–5.1)
CREAT SERPL-MCNC: 0.8 MG/DL (ref 0.9–1.3)
EOSINOPHILS ABSOLUTE: 0.1 K/UL (ref 0–0.6)
EOSINOPHILS RELATIVE PERCENT: 3 %
GFR AFRICAN AMERICAN: >60
GFR NON-AFRICAN AMERICAN: >60
HCT VFR BLD CALC: 44 % (ref 40.5–52.5)
HEMOGLOBIN: 15.1 G/DL (ref 13.5–17.5)
LYMPHOCYTES ABSOLUTE: 1.3 K/UL (ref 1–5.1)
LYMPHOCYTES RELATIVE PERCENT: 39.9 %
MCH RBC QN AUTO: 31 PG (ref 26–34)
MCHC RBC AUTO-ENTMCNC: 34.2 G/DL (ref 31–36)
MCV RBC AUTO: 90.7 FL (ref 80–100)
MONOCYTES ABSOLUTE: 0.3 K/UL (ref 0–1.3)
MONOCYTES RELATIVE PERCENT: 8.8 %
NEUTROPHILS ABSOLUTE: 1.5 K/UL (ref 1.7–7.7)
NEUTROPHILS RELATIVE PERCENT: 47.2 %
PDW BLD-RTO: 12.5 % (ref 12.4–15.4)
PLATELET # BLD: 231 K/UL (ref 135–450)
PMV BLD AUTO: 8.4 FL (ref 5–10.5)
RBC # BLD: 4.85 M/UL (ref 4.2–5.9)
SEDIMENTATION RATE, ERYTHROCYTE: 5 MM/HR (ref 0–15)
WBC # BLD: 3.2 K/UL (ref 4–11)

## 2022-07-22 PROCEDURE — 36415 COLL VENOUS BLD VENIPUNCTURE: CPT | Performed by: INTERNAL MEDICINE

## 2022-07-22 PROCEDURE — 99214 OFFICE O/P EST MOD 30 MIN: CPT | Performed by: INTERNAL MEDICINE

## 2022-07-22 RX ORDER — SULFASALAZINE 500 MG/1
TABLET ORAL
Qty: 360 TABLET | Refills: 0 | Status: SHIPPED | OUTPATIENT
Start: 2022-07-22

## 2022-07-22 NOTE — PROGRESS NOTES
[address], [place]                                                           [address]                                                              (P) [phone] (F)         Note is transcribed using voice recognition software. Inadvertent computerized transcription errors may be present. Patient identification: Carlos Hanks,: 1990,31 y.o. Sex: male     A/P  Freddy was seen today for follow-up. Diagnoses and all orders for this visit:    Psoriatic arthritis ([facility]    Psoriasis    High risk medication use    Inflammatory arthritis diagnosed at the age of 6, took methotrexate, Enbrel and naproxen until age 23. A/P Today's visit-  Psoriasis and psoriatic arthritis-asymptomatic for last 2 years. Reduce sulfasalazine 1500 mg a day. Check safety labs today. If he does experience symptoms, resume 2 g a day. Normal ADLs and recreational activities. RF, CCP, BYRON negative. RTC 3 months. Patient indicates understanding and agrees with the management plan. I reviewed patient's history, referral documents and electronic medical records. #######################################################################    Subjective-  Is doing well, denies any musculoskeletal symptoms or skin psoriasis. Pleased on sulfasalazine. Normal ADLs and recreational activities. Safety labs have been stable. All other review of systems are negative. Past Medical History:   Diagnosis Date    Anxiety     JRA (juvenile rheumatoid arthritis) ([facility]      No past surgical history on file. FH- 2 P uncles- Psoriasis.     Current Outpatient Medications   Medication Sig Dispense Refill    sulfaSALAzine (AZULFIDINE) 500 MG tablet Take  2 tab po  tablet 1 sulfaSALAzine (AZULFIDINE) 500 MG tablet Take  2 tab po  tablet 0    sulfaSALAzine (AZULFIDINE) 500 MG tablet Take  2 tab po  tablet 0    sulfaSALAzine (AZULFIDINE) 500 MG tablet Take  2 tab po  tablet 0    sulfaSALAzine (AZULFIDINE) 500 MG tablet Take 2 tablets by mouth 2 times daily 360 tablet 0     No current facility-administered medications for this visit. No Known Allergies    PHYSICAL EXAM:    Vitals:    /76   Ht 6' 3\" (1.905 m)   Wt 217 lb (98.4 kg)   BMI 27.12 kg/m²   General appearance/ Psychiatric: well nourished, and well groomed, normal judgement, alert, appears stated age and cooperative. MKS: Normal musculoskeletal exam in upper, lower extremities and spine. No dactylitis or enthesitis. Skin: No active psoriasis. No induration, rashes, erythema. No nail changes or digital ischemia. DATA:   Lab Results   Component Value Date    WBC 3.2 (L) 03/18/2022    HGB 15.2 03/18/2022    HCT 44.2 03/18/2022    MCV 90.4 03/18/2022     03/18/2022         Chemistry        Component Value Date/Time     03/18/2022 0958    K 4.8 03/18/2022 0958     03/18/2022 0958    CO2 21 03/18/2022 0958    BUN 13 03/18/2022 0958    CREATININE 0.8 (L) 03/18/2022 0958        Component Value Date/Time    CALCIUM 10.0 03/18/2022 0958    ALKPHOS 67 03/18/2022 0958    AST 24 03/18/2022 0958    ALT 19 03/18/2022 0958    BILITOT 0.4 03/18/2022 0958          No results found for: [facility]  Lab Results   Component Value Date    SEDRATE 0 12/17/2021     Lab Results   Component Value Date    CRP <3.0 12/17/2021     Lab Results   Component Value Date    BYRON Negative 08/16/2019    SEDRATE 0 12/17/2021        A/P- See above.

## 2022-09-12 ENCOUNTER — OFFICE VISIT (OUTPATIENT)
Dept: RHEUMATOLOGY | Age: 32
End: 2022-09-12
Payer: COMMERCIAL

## 2022-09-12 VITALS
SYSTOLIC BLOOD PRESSURE: 120 MMHG | HEIGHT: 75 IN | BODY MASS INDEX: 26.98 KG/M2 | WEIGHT: 217 LBS | DIASTOLIC BLOOD PRESSURE: 80 MMHG

## 2022-09-12 DIAGNOSIS — Z79.899 HIGH RISK MEDICATION USE: ICD-10-CM

## 2022-09-12 DIAGNOSIS — M25.561 PAIN AND SWELLING OF RIGHT KNEE: Primary | ICD-10-CM

## 2022-09-12 DIAGNOSIS — M25.461 PAIN AND SWELLING OF RIGHT KNEE: Primary | ICD-10-CM

## 2022-09-12 DIAGNOSIS — L40.9 PSORIASIS: ICD-10-CM

## 2022-09-12 DIAGNOSIS — L40.50 PSORIATIC ARTHRITIS (HCC): ICD-10-CM

## 2022-09-12 LAB
APPEARANCE FLUID: CLEAR
CELL COUNT FLUID TYPE: NORMAL
CLOT EVALUATION: NORMAL
COLOR FLUID: NORMAL
CRYSTALS, FLUID: NORMAL
LYMPHOCYTES, BODY FLUID: 66 %
MACROPHAGE FLUID: 26 %
MONOCYTE, FLUID: 2 %
NEUTROPHIL, FLUID: 6 %
NUCLEATED CELLS FLUID: 2870 /CUMM
NUMBER OF CELLS COUNTED FLUID: 100
RBC FLUID: <2000 /CUMM
SOURCE BODY FLUID: NORMAL

## 2022-09-12 PROCEDURE — 20610 DRAIN/INJ JOINT/BURSA W/O US: CPT | Performed by: INTERNAL MEDICINE

## 2022-09-12 PROCEDURE — 99214 OFFICE O/P EST MOD 30 MIN: CPT | Performed by: INTERNAL MEDICINE

## 2022-09-12 RX ORDER — TRIAMCINOLONE ACETONIDE 40 MG/ML
40 INJECTION, SUSPENSION INTRA-ARTICULAR; INTRAMUSCULAR ONCE
Status: COMPLETED | OUTPATIENT
Start: 2022-09-12 | End: 2022-09-12

## 2022-09-12 RX ORDER — LIDOCAINE HYDROCHLORIDE 10 MG/ML
2 INJECTION, SOLUTION EPIDURAL; INFILTRATION; INTRACAUDAL; PERINEURAL ONCE
Status: COMPLETED | OUTPATIENT
Start: 2022-09-12 | End: 2022-09-12

## 2022-09-12 RX ORDER — PREDNISONE 10 MG/1
TABLET ORAL
Qty: 25 TABLET | Refills: 0 | Status: SHIPPED | OUTPATIENT
Start: 2022-09-12

## 2022-09-12 RX ADMIN — TRIAMCINOLONE ACETONIDE 40 MG: 40 INJECTION, SUSPENSION INTRA-ARTICULAR; INTRAMUSCULAR at 10:25

## 2022-09-12 RX ADMIN — LIDOCAINE HYDROCHLORIDE 2 ML: 10 INJECTION, SOLUTION EPIDURAL; INFILTRATION; INTRACAUDAL; PERINEURAL at 10:25

## 2022-09-12 NOTE — PROGRESS NOTES
[address], MD                                                           [address] [place] [address]                                                             [phone] (P) [phone] (F)         Note is transcribed using voice recognition software. Inadvertent computerized transcription errors may be present. Patient identification: Meño Kramer Latonya,: 1990,31 y.o. Sex: male     A/P  Freddy was seen today for joint swelling. Diagnoses and all orders for this visit:    Pain and swelling of right knee    Psoriatic arthritis ([place]    Psoriasis    High risk medication use      Inflammatory arthritis diagnosed at the age of 6, took methotrexate, Enbrel and naproxen until age 23. A/P Today's visit-  Right knee pain and swelling-psoriatic arthritis versus noninflammatory  knee aspirate-noninflammatory looking fluid in gross examination, sent to lab for cell count and crystal.      Indication: Right knee pain and swelling    Procedure details: After explaining risk and benefits of the procedure and informed consent, skin was prepped with Chloroprep and anaesthetized with ethylene chloride spray plain Lidocaine 1 %. Under sterile fashion, joint was entered via superior lateral approach  with 21 G needle and 42 ml's of transparent, straw-colored colored fluid was withdrawn and sent for analysis. Kenalog 40 mg  was injected and the needle withdrawn. Procedure was well tolerated. Post injection care was discussed with patient. Call or return to clinic prn if such symptoms occur or there is failure to improve as anticipated.      2.  Psoriasis-asymptomatic    Psoriatic arthritis-asymptomatic-other than swollen right knee, unclear etiology psoriasis versus noninflammatory joint effusion. Continue sulfasalazine 1 g twice daily. If symptoms are not getting better, advised patient to use prednisone taper. RTC 2  months. Patient indicates understanding and agrees with the management plan. I reviewed patient's history, referral documents and electronic medical records. #######################################################################    Subjective-  This is an urgent appointment because of painful swollen right knee that began approximately 4 weeks ago, has been getting worse over time. Insidious onset, symptoms are getting worse over time. He is also experiencing discomfort pain is both ankles specially anterior joint line. No other swollen or inflamed joints. Compliant with medications,No trauma. Does not recall any infections or precipitating factors. He used to have similar swellings in the past, has not had it in years. .All other review of systems are negative. Past Medical History:   Diagnosis Date    Anxiety     JRA (juvenile rheumatoid arthritis) ([facility] [address]      No past surgical history on file. FH- 2 P uncles- Psoriasis. Current Outpatient Medications   Medication Sig Dispense Refill    sulfaSALAzine (AZULFIDINE) 500 MG tablet Take  2 tab po  tablet 0    sulfaSALAzine (AZULFIDINE) 500 MG tablet Take  2 tab po  tablet 1    sulfaSALAzine (AZULFIDINE) 500 MG tablet Take  2 tab po  tablet 0    sulfaSALAzine (AZULFIDINE) 500 MG tablet Take  2 tab po  tablet 0    sulfaSALAzine (AZULFIDINE) 500 MG tablet Take  2 tab po  tablet 0    sulfaSALAzine (AZULFIDINE) 500 MG tablet Take 2 tablets by mouth 2 times daily 360 tablet 0     No current facility-administered medications for this visit.      No Known Allergies    PHYSICAL EXAM:    Vitals:    /80   Ht 6' 3\" (1.905 m)   Wt 217 lb (98.4 kg)   BMI 27.12 kg/m²   General appearance/ Psychiatric: well nourished, and well groomed, normal judgement, alert, appears stated age and cooperative. MKS:   Right knee-obvious effusion, mild warmth, painful range of motion but full range of motion. Subjective discomfort in anterior ankle joint line bilaterally, no appreciable findings. Otherwise, normal musculoskeletal exam in upper, lower extremities and spine. No dactylitis or enthesitis. Skin: No active psoriasis. No induration, rashes, erythema. No nail changes or digital ischemia. DATA:   Lab Results   Component Value Date    WBC 3.2 (L) 07/22/2022    HGB 15.1 07/22/2022    HCT 44.0 07/22/2022    MCV 90.7 07/22/2022     07/22/2022         Chemistry        Component Value Date/Time     03/18/2022 0958    K 4.8 03/18/2022 0958     03/18/2022 0958    CO2 21 03/18/2022 0958    BUN 13 03/18/2022 0958    CREATININE 0.8 (L) 07/22/2022 0952        Component Value Date/Time    CALCIUM 10.0 03/18/2022 0958    ALKPHOS 67 03/18/2022 0958    AST 32 07/22/2022 0952    ALT 25 07/22/2022 0952    BILITOT 0.4 03/18/2022 0958          No results found for: [facility]  Lab Results   Component Value Date    SEDRATE 5 07/22/2022     Lab Results   Component Value Date    CRP <3.0 07/22/2022     Lab Results   Component Value Date    BYRON Negative 08/16/2019    SEDRATE 5 07/22/2022        A/P- See above. Total time 34 minutes- reviewing medical records & pre charting on the same day of visit,  history taking, exam, explaining medical diagnosis, management plan, ordering labs, filling medications, communicating findings with other providers and medical documentation in EHR.